# Patient Record
Sex: FEMALE | Race: OTHER | HISPANIC OR LATINO
[De-identification: names, ages, dates, MRNs, and addresses within clinical notes are randomized per-mention and may not be internally consistent; named-entity substitution may affect disease eponyms.]

---

## 2023-01-10 PROBLEM — Z00.00 ENCOUNTER FOR PREVENTIVE HEALTH EXAMINATION: Status: ACTIVE | Noted: 2023-01-10

## 2023-01-11 ENCOUNTER — ASOB RESULT (OUTPATIENT)
Age: 38
End: 2023-01-11

## 2023-01-11 ENCOUNTER — APPOINTMENT (OUTPATIENT)
Dept: ANTEPARTUM | Facility: CLINIC | Age: 38
End: 2023-01-11
Payer: MEDICAID

## 2023-01-11 DIAGNOSIS — O35.1XX0 MATERNAL CARE FOR (SUSPECTED) CHROMOSOMAL ABNORMALITY IN FETUS, NOT APPLICABLE OR UNSPECIFIED: ICD-10-CM

## 2023-01-11 PROCEDURE — 76813 OB US NUCHAL MEAS 1 GEST: CPT

## 2023-01-11 PROCEDURE — 36415 COLL VENOUS BLD VENIPUNCTURE: CPT

## 2023-01-16 LAB
ADDITIONAL US: NORMAL
COMMENTS: AFP: NORMAL
CRL SCAN TWIN B: NORMAL
CRL SCAN: NORMAL
CROWN RUMP LENGTH TWIN B: NORMAL
CROWN RUMP LENGTH: 72.9 MM
DOWN SYNDROME AGE RISK: NORMAL
DOWN SYNDROME INTERPRETATION: NORMAL
DOWN SYNDROME SCREENING RISK: NORMAL
GEST. AGE ON COLLECTION DATE: 13.1 WEEKS
HCG MOM: 0.97
HCG VALUE: 105.1 IU/ML
MATERNAL AGE AT EDD: 37.2 YR
NOTE: AFP: NORMAL
NT MOM TWIN B: NORMAL
NT TWIN B: NORMAL
NUCHAL TRANSLUCENCY (NT): 2 MM
NUCHAL TRANSLUCENCY MOM: 1.44
NUMBER OF FETUSES: 1
PAPP-A MOM: 0.37
PAPP-A VALUE: 609.2 NG/ML
RACE: NORMAL
RESULTS AFP: NORMAL
SONOGRAPHER ID#: NORMAL
SUBMIT PART 2 SAMPLE USING: NORMAL
TEST RESULTS: AFP: NORMAL
TRISOMY 18 AGE RISK: NORMAL
TRISOMY 18 INTERPRETATION: NORMAL
TRISOMY 18 SCREENING RISK: NORMAL
WEIGHT AFP: 115 LBS

## 2023-02-03 ENCOUNTER — APPOINTMENT (OUTPATIENT)
Dept: ANTEPARTUM | Facility: CLINIC | Age: 38
End: 2023-02-03
Payer: MEDICAID

## 2023-02-03 PROCEDURE — 36415 COLL VENOUS BLD VENIPUNCTURE: CPT

## 2023-02-16 LAB
ADDITIONAL US: NORMAL
AFP MOM: 0.79
AFP VALUE: 31.6 NG/ML
COLLECTED ON 2: NORMAL
COLLECTED ON: NORMAL
CRL SCAN TWIN B: NORMAL
CRL SCAN: NORMAL
CROWN RUMP LENGTH TWIN B: NORMAL
CROWN RUMP LENGTH: 72.9 MM
DIA MOM: 0.6
DIA VALUE: 108 PG/ML
DOWN SYNDROME AGE RISK: NORMAL
DOWN SYNDROME INTERPRETATION: NORMAL
DOWN SYNDROME SCREENING RISK: NORMAL
FIRST TRIMESTER SAMPLE: NORMAL
GEST. AGE ON COLLECTION DATE: 13.1 WEEKS
GESTATIONAL AGE: 16.4 WEEKS
HCG MOM: 1
HCG VALUE: 39.7 IU/ML
INSULIN DEP DIABETES: NO
MATERNAL AGE AT EDD: 37.2 YR
NT MOM TWIN B: NORMAL
NT TWIN B: NORMAL
NUCHAL TRANSLUCENCY (NT): 2 MM
NUCHAL TRANSLUCENCY MOM: 1.44
NUMBER OF FETUSES: 1
OPEN SPINA BIFIDA: NORMAL
OSB INTERPRETATION: NORMAL
PAPP-A MOM: 0.37
PAPP-A VALUE: 609.2 NG/ML
RACE: NORMAL
SECOND TRIMESTER SAMPLE: NORMAL
SEQUENTIAL 2 COMMENTS: NORMAL
SEQUENTIAL 2 NOTE: NORMAL
SEQUENTIAL 2 RESULTS: NORMAL
SEQUENTIAL 2 TEST RESULTS: NORMAL
SONOGRAPHER ID#: NORMAL
TRISOMY 18 AGE RISK: NORMAL
TRISOMY 18 INTERPRETATION: NORMAL
TRISOMY 18 SCREENING RISK: NORMAL
UE3 MOM: 1.74
UE3 VALUE: 1.91 NG/ML
WEIGHT AFP: 115 LBS
WEIGHT: 120 LBS

## 2023-03-10 ENCOUNTER — ASOB RESULT (OUTPATIENT)
Age: 38
End: 2023-03-10

## 2023-03-10 ENCOUNTER — APPOINTMENT (OUTPATIENT)
Dept: ANTEPARTUM | Facility: CLINIC | Age: 38
End: 2023-03-10
Payer: MEDICAID

## 2023-03-10 PROCEDURE — 76811 OB US DETAILED SNGL FETUS: CPT

## 2023-03-13 ENCOUNTER — APPOINTMENT (OUTPATIENT)
Dept: MATERNAL FETAL MEDICINE | Facility: CLINIC | Age: 38
End: 2023-03-13
Payer: MEDICAID

## 2023-03-13 ENCOUNTER — APPOINTMENT (OUTPATIENT)
Dept: ANTEPARTUM | Facility: CLINIC | Age: 38
End: 2023-03-13
Payer: MEDICAID

## 2023-03-13 VITALS
DIASTOLIC BLOOD PRESSURE: 72 MMHG | BODY MASS INDEX: 24.8 KG/M2 | RESPIRATION RATE: 18 BRPM | HEART RATE: 102 BPM | SYSTOLIC BLOOD PRESSURE: 118 MMHG | HEIGHT: 59 IN | WEIGHT: 123 LBS

## 2023-03-13 DIAGNOSIS — O28.0 ABNORMAL HEMATOLOGICAL FINDING ON ANTENATAL SCREENING OF MOTHER: ICD-10-CM

## 2023-03-13 DIAGNOSIS — O09.899 ABNORMAL HEMATOLOGICAL FINDING ON ANTENATAL SCREENING OF MOTHER: ICD-10-CM

## 2023-03-13 PROCEDURE — 99214 OFFICE O/P EST MOD 30 MIN: CPT | Mod: TH

## 2023-03-13 PROCEDURE — ZZZZZ: CPT

## 2023-03-13 RX ORDER — PRENATAL VIT NO.126/IRON/FOLIC 28MG-0.8MG
28-0.8 TABLET ORAL
Refills: 0 | Status: ACTIVE | COMMUNITY

## 2023-03-13 NOTE — DISCUSSION/SUMMARY
[FreeTextEntry1] : Repeat growth scan is scheduled at 26 weeks, and should be repeated every 4 weeks. \par \par Weekly fetal testing to begin at 36 weeks. \par \par Baby aspirin for PEC prophylaxis.

## 2023-03-13 NOTE — DATA REVIEWED
[FreeTextEntry1] : We reviewed the Level 2 sonogram from last week.\par \par The issues associated with low NILO A were discussed including associations with poor placental function. We discussed the increased incidence of preeclampisa, poor fetal growth and  delivery\par \par baby aspirin is suggested as prophylaxis to PEC in women at an increased risk. \par \par Increased surveillance of fetal growth is scheduled, with the next growth sonogram in 6 weeks. \par \par All of Ritas questions were answered.

## 2023-04-17 ENCOUNTER — ASOB RESULT (OUTPATIENT)
Age: 38
End: 2023-04-17

## 2023-04-17 ENCOUNTER — APPOINTMENT (OUTPATIENT)
Dept: ANTEPARTUM | Facility: CLINIC | Age: 38
End: 2023-04-17
Payer: MEDICAID

## 2023-04-17 PROCEDURE — 76816 OB US FOLLOW-UP PER FETUS: CPT

## 2023-04-17 PROCEDURE — 76820 UMBILICAL ARTERY ECHO: CPT | Mod: 59

## 2023-05-22 ENCOUNTER — APPOINTMENT (OUTPATIENT)
Dept: MATERNAL FETAL MEDICINE | Facility: CLINIC | Age: 38
End: 2023-05-22
Payer: MEDICAID

## 2023-05-22 ENCOUNTER — ASOB RESULT (OUTPATIENT)
Age: 38
End: 2023-05-22

## 2023-05-22 ENCOUNTER — APPOINTMENT (OUTPATIENT)
Dept: MATERNAL FETAL MEDICINE | Facility: CLINIC | Age: 38
End: 2023-05-22

## 2023-05-22 VITALS — BODY MASS INDEX: 25.6 KG/M2 | HEIGHT: 59 IN | WEIGHT: 127 LBS

## 2023-05-22 DIAGNOSIS — Z78.9 OTHER SPECIFIED HEALTH STATUS: ICD-10-CM

## 2023-05-22 DIAGNOSIS — O99.810 ABNORMAL GLUCOSE COMPLICATING PREGNANCY: ICD-10-CM

## 2023-05-22 PROCEDURE — G0108 DIAB MANAGE TRN  PER INDIV: CPT | Mod: 95

## 2023-05-30 ENCOUNTER — APPOINTMENT (OUTPATIENT)
Dept: ANTEPARTUM | Facility: CLINIC | Age: 38
End: 2023-05-30
Payer: MEDICAID

## 2023-05-30 ENCOUNTER — ASOB RESULT (OUTPATIENT)
Age: 38
End: 2023-05-30

## 2023-05-30 PROCEDURE — 76820 UMBILICAL ARTERY ECHO: CPT | Mod: 59

## 2023-05-30 PROCEDURE — 76816 OB US FOLLOW-UP PER FETUS: CPT

## 2023-06-05 ENCOUNTER — ASOB RESULT (OUTPATIENT)
Age: 38
End: 2023-06-05

## 2023-06-05 ENCOUNTER — APPOINTMENT (OUTPATIENT)
Dept: ANTEPARTUM | Facility: CLINIC | Age: 38
End: 2023-06-05
Payer: MEDICAID

## 2023-06-05 PROCEDURE — 76821 MIDDLE CEREBRAL ARTERY ECHO: CPT

## 2023-06-05 PROCEDURE — 76818 FETAL BIOPHYS PROFILE W/NST: CPT

## 2023-06-05 PROCEDURE — 76820 UMBILICAL ARTERY ECHO: CPT

## 2023-06-12 ENCOUNTER — ASOB RESULT (OUTPATIENT)
Age: 38
End: 2023-06-12

## 2023-06-12 ENCOUNTER — APPOINTMENT (OUTPATIENT)
Dept: ANTEPARTUM | Facility: CLINIC | Age: 38
End: 2023-06-12
Payer: MEDICAID

## 2023-06-12 PROCEDURE — 76818 FETAL BIOPHYS PROFILE W/NST: CPT

## 2023-06-12 PROCEDURE — 76820 UMBILICAL ARTERY ECHO: CPT

## 2023-06-19 ENCOUNTER — ASOB RESULT (OUTPATIENT)
Age: 38
End: 2023-06-19

## 2023-06-19 ENCOUNTER — APPOINTMENT (OUTPATIENT)
Dept: ANTEPARTUM | Facility: CLINIC | Age: 38
End: 2023-06-19
Payer: MEDICAID

## 2023-06-19 PROCEDURE — 76816 OB US FOLLOW-UP PER FETUS: CPT

## 2023-06-19 PROCEDURE — 76818 FETAL BIOPHYS PROFILE W/NST: CPT

## 2023-06-19 PROCEDURE — 76820 UMBILICAL ARTERY ECHO: CPT | Mod: 59

## 2023-06-24 ENCOUNTER — INPATIENT (INPATIENT)
Facility: HOSPITAL | Age: 38
LOS: 2 days | Discharge: HOME | DRG: 540 | End: 2023-06-27
Attending: OBSTETRICS & GYNECOLOGY | Admitting: OBSTETRICS & GYNECOLOGY
Payer: MEDICAID

## 2023-06-24 VITALS — HEIGHT: 59 IN | WEIGHT: 136.03 LBS

## 2023-06-24 DIAGNOSIS — O26.899 OTHER SPECIFIED PREGNANCY RELATED CONDITIONS, UNSPECIFIED TRIMESTER: ICD-10-CM

## 2023-06-24 DIAGNOSIS — Z3A.00 WEEKS OF GESTATION OF PREGNANCY NOT SPECIFIED: ICD-10-CM

## 2023-06-24 LAB
ABO RH CONFIRMATION: SIGNIFICANT CHANGE UP
BASOPHILS # BLD AUTO: 0.03 K/UL — SIGNIFICANT CHANGE UP (ref 0–0.2)
BASOPHILS NFR BLD AUTO: 0.3 % — SIGNIFICANT CHANGE UP (ref 0–2)
EOSINOPHIL # BLD AUTO: 0.1 K/UL — SIGNIFICANT CHANGE UP (ref 0–0.5)
EOSINOPHIL NFR BLD AUTO: 1 % — SIGNIFICANT CHANGE UP (ref 0–6)
HCT VFR BLD CALC: 37.3 % — SIGNIFICANT CHANGE UP (ref 34.5–45)
HGB BLD-MCNC: 13.3 G/DL — SIGNIFICANT CHANGE UP (ref 11.5–15.5)
IMM GRANULOCYTES NFR BLD AUTO: 0.5 % — SIGNIFICANT CHANGE UP (ref 0–0.9)
LYMPHOCYTES # BLD AUTO: 1.9 K/UL — SIGNIFICANT CHANGE UP (ref 1–3.3)
LYMPHOCYTES # BLD AUTO: 19.9 % — SIGNIFICANT CHANGE UP (ref 13–44)
MCHC RBC-ENTMCNC: 31.4 PG — SIGNIFICANT CHANGE UP (ref 27–34)
MCHC RBC-ENTMCNC: 35.7 GM/DL — SIGNIFICANT CHANGE UP (ref 32–36)
MCV RBC AUTO: 88.2 FL — SIGNIFICANT CHANGE UP (ref 80–100)
MONOCYTES # BLD AUTO: 0.59 K/UL — SIGNIFICANT CHANGE UP (ref 0–0.9)
MONOCYTES NFR BLD AUTO: 6.2 % — SIGNIFICANT CHANGE UP (ref 2–14)
NEUTROPHILS # BLD AUTO: 6.9 K/UL — SIGNIFICANT CHANGE UP (ref 1.8–7.4)
NEUTROPHILS NFR BLD AUTO: 72.1 % — SIGNIFICANT CHANGE UP (ref 43–77)
PLATELET # BLD AUTO: 184 K/UL — SIGNIFICANT CHANGE UP (ref 150–400)
RBC # BLD: 4.23 M/UL — SIGNIFICANT CHANGE UP (ref 3.8–5.2)
RBC # FLD: 13.5 % — SIGNIFICANT CHANGE UP (ref 10.3–14.5)
WBC # BLD: 9.57 K/UL — SIGNIFICANT CHANGE UP (ref 3.8–10.5)
WBC # FLD AUTO: 9.57 K/UL — SIGNIFICANT CHANGE UP (ref 3.8–10.5)

## 2023-06-24 PROCEDURE — 86900 BLOOD TYPING SEROLOGIC ABO: CPT

## 2023-06-24 PROCEDURE — 36415 COLL VENOUS BLD VENIPUNCTURE: CPT

## 2023-06-24 PROCEDURE — 86920 COMPATIBILITY TEST SPIN: CPT

## 2023-06-24 PROCEDURE — 74018 RADEX ABDOMEN 1 VIEW: CPT

## 2023-06-24 PROCEDURE — 72170 X-RAY EXAM OF PELVIS: CPT

## 2023-06-24 PROCEDURE — 85025 COMPLETE CBC W/AUTO DIFF WBC: CPT

## 2023-06-24 PROCEDURE — 85384 FIBRINOGEN ACTIVITY: CPT

## 2023-06-24 PROCEDURE — 84550 ASSAY OF BLOOD/URIC ACID: CPT

## 2023-06-24 PROCEDURE — 74018 RADEX ABDOMEN 1 VIEW: CPT | Mod: 26

## 2023-06-24 PROCEDURE — 85610 PROTHROMBIN TIME: CPT

## 2023-06-24 PROCEDURE — 80053 COMPREHEN METABOLIC PANEL: CPT

## 2023-06-24 PROCEDURE — 83615 LACTATE (LD) (LDH) ENZYME: CPT

## 2023-06-24 PROCEDURE — 86901 BLOOD TYPING SEROLOGIC RH(D): CPT

## 2023-06-24 PROCEDURE — 85027 COMPLETE CBC AUTOMATED: CPT

## 2023-06-24 PROCEDURE — 72170 X-RAY EXAM OF PELVIS: CPT | Mod: 26

## 2023-06-24 PROCEDURE — 82570 ASSAY OF URINE CREATININE: CPT

## 2023-06-24 PROCEDURE — 81001 URINALYSIS AUTO W/SCOPE: CPT

## 2023-06-24 PROCEDURE — 84156 ASSAY OF PROTEIN URINE: CPT

## 2023-06-24 PROCEDURE — 85730 THROMBOPLASTIN TIME PARTIAL: CPT

## 2023-06-24 PROCEDURE — 86850 RBC ANTIBODY SCREEN: CPT

## 2023-06-24 PROCEDURE — 86780 TREPONEMA PALLIDUM: CPT

## 2023-06-24 RX ORDER — OXYTOCIN 10 UNIT/ML
333.33 VIAL (ML) INJECTION
Qty: 20 | Refills: 0 | Status: DISCONTINUED | OUTPATIENT
Start: 2023-06-24 | End: 2023-06-25

## 2023-06-24 RX ORDER — TETANUS TOXOID, REDUCED DIPHTHERIA TOXOID AND ACELLULAR PERTUSSIS VACCINE, ADSORBED 5; 2.5; 8; 8; 2.5 [IU]/.5ML; [IU]/.5ML; UG/.5ML; UG/.5ML; UG/.5ML
0.5 SUSPENSION INTRAMUSCULAR ONCE
Refills: 0 | Status: DISCONTINUED | OUTPATIENT
Start: 2023-06-24 | End: 2023-06-27

## 2023-06-24 RX ORDER — DIPHENHYDRAMINE HCL 50 MG
25 CAPSULE ORAL EVERY 6 HOURS
Refills: 0 | Status: DISCONTINUED | OUTPATIENT
Start: 2023-06-24 | End: 2023-06-27

## 2023-06-24 RX ORDER — SODIUM CHLORIDE 9 MG/ML
1000 INJECTION, SOLUTION INTRAVENOUS
Refills: 0 | Status: DISCONTINUED | OUTPATIENT
Start: 2023-06-24 | End: 2023-06-27

## 2023-06-24 RX ORDER — MAGNESIUM HYDROXIDE 400 MG/1
30 TABLET, CHEWABLE ORAL
Refills: 0 | Status: DISCONTINUED | OUTPATIENT
Start: 2023-06-24 | End: 2023-06-27

## 2023-06-24 RX ORDER — CITRIC ACID/SODIUM CITRATE 300-500 MG
30 SOLUTION, ORAL ORAL ONCE
Refills: 0 | Status: COMPLETED | OUTPATIENT
Start: 2023-06-24 | End: 2023-06-24

## 2023-06-24 RX ORDER — OXYTOCIN 10 UNIT/ML
333.33 VIAL (ML) INJECTION
Qty: 20 | Refills: 0 | Status: DISCONTINUED | OUTPATIENT
Start: 2023-06-24 | End: 2023-06-27

## 2023-06-24 RX ORDER — LANOLIN
1 OINTMENT (GRAM) TOPICAL EVERY 6 HOURS
Refills: 0 | Status: DISCONTINUED | OUTPATIENT
Start: 2023-06-24 | End: 2023-06-27

## 2023-06-24 RX ORDER — AZITHROMYCIN 500 MG/1
500 TABLET, FILM COATED ORAL ONCE
Refills: 0 | Status: COMPLETED | OUTPATIENT
Start: 2023-06-24 | End: 2023-06-24

## 2023-06-24 RX ORDER — SODIUM CHLORIDE 9 MG/ML
1000 INJECTION, SOLUTION INTRAVENOUS ONCE
Refills: 0 | Status: COMPLETED | OUTPATIENT
Start: 2023-06-24 | End: 2023-06-24

## 2023-06-24 RX ORDER — SODIUM CHLORIDE 9 MG/ML
1000 INJECTION, SOLUTION INTRAVENOUS
Refills: 0 | Status: DISCONTINUED | OUTPATIENT
Start: 2023-06-24 | End: 2023-06-25

## 2023-06-24 RX ORDER — ACETAMINOPHEN 500 MG
975 TABLET ORAL
Refills: 0 | Status: DISCONTINUED | OUTPATIENT
Start: 2023-06-24 | End: 2023-06-27

## 2023-06-24 RX ORDER — IBUPROFEN 200 MG
600 TABLET ORAL EVERY 6 HOURS
Refills: 0 | Status: DISCONTINUED | OUTPATIENT
Start: 2023-06-24 | End: 2023-06-24

## 2023-06-24 RX ORDER — OXYCODONE HYDROCHLORIDE 5 MG/1
5 TABLET ORAL
Refills: 0 | Status: COMPLETED | OUTPATIENT
Start: 2023-06-24 | End: 2023-07-01

## 2023-06-24 RX ORDER — SIMETHICONE 80 MG/1
80 TABLET, CHEWABLE ORAL EVERY 4 HOURS
Refills: 0 | Status: DISCONTINUED | OUTPATIENT
Start: 2023-06-24 | End: 2023-06-27

## 2023-06-24 RX ORDER — FAMOTIDINE 10 MG/ML
20 INJECTION INTRAVENOUS ONCE
Refills: 0 | Status: COMPLETED | OUTPATIENT
Start: 2023-06-24 | End: 2023-06-24

## 2023-06-24 RX ORDER — OXYCODONE HYDROCHLORIDE 5 MG/1
5 TABLET ORAL ONCE
Refills: 0 | Status: DISCONTINUED | OUTPATIENT
Start: 2023-06-24 | End: 2023-06-27

## 2023-06-24 RX ORDER — HYDROMORPHONE HYDROCHLORIDE 2 MG/ML
0.5 INJECTION INTRAMUSCULAR; INTRAVENOUS; SUBCUTANEOUS
Refills: 0 | Status: DISCONTINUED | OUTPATIENT
Start: 2023-06-24 | End: 2023-06-27

## 2023-06-24 RX ADMIN — Medication 30 MILLILITER(S): at 21:18

## 2023-06-24 RX ADMIN — Medication 975 MILLIGRAM(S): at 23:35

## 2023-06-24 RX ADMIN — AZITHROMYCIN 255 MILLIGRAM(S): 500 TABLET, FILM COATED ORAL at 21:28

## 2023-06-24 RX ADMIN — Medication 1000 MILLIUNIT(S)/MIN: at 22:47

## 2023-06-24 RX ADMIN — SODIUM CHLORIDE 2000 MILLILITER(S): 9 INJECTION, SOLUTION INTRAVENOUS at 21:10

## 2023-06-24 RX ADMIN — FAMOTIDINE 20 MILLIGRAM(S): 10 INJECTION INTRAVENOUS at 21:18

## 2023-06-24 NOTE — PATIENT PROFILE OB - FUNCTIONAL ASSESSMENT - BASIC MOBILITY 6.
4-calculated by average/Not able to assess (calculate score using [facility] averaging method)

## 2023-06-24 NOTE — PATIENT PROFILE OB - FALL HARM RISK - UNIVERSAL INTERVENTIONS
Bed in lowest position, wheels locked, appropriate side rails in place/Call bell, personal items and telephone in reach/Instruct patient to call for assistance before getting out of bed or chair/Non-slip footwear when patient is out of bed/[place] to call system/Physically safe environment - no spills, clutter or unnecessary equipment/Purposeful Proactive Rounding/Room/bathroom lighting operational, light cord in reach

## 2023-06-24 NOTE — PATIENT PROFILE OB - PRO ROOMING IN YN OB
Holter exam results discussed with patient. Nothing significant per Dr Dubon. Needs to see PCP for possible anxiety triggered sinus tach   yes

## 2023-06-24 NOTE — PATIENT PROFILE OB - AS SC BRADEN NUTRITION
Lab Results   Component Value Date    HGBA1C 9 2 (H) 12/08/2022       Recent Labs     12/10/22  1113 12/10/22  1550 12/10/22  2101 12/11/22  0613   POCGLU 203* 147* 201* 188*       Blood Sugar Average: Last 72 hrs:  (P) 222 [id number]   Home Regimen: Metformin, Amaryl, Trulicity, Farxiga    Plan:  • Hold oral antihyperglycemics  • Diet Consistent CHO Level 2 (5 CHO servings/75g CHO per meal)  • Insulin regimen  o Glucose checks and Insulin correction ACHS  • Goal -180 while admitted, adjusting insulin regimen as appropriate  Monitor for hypoglycemia and treat per protocol    Close outpatient follow-up with family doctor (3) adequate

## 2023-06-25 ENCOUNTER — TRANSCRIPTION ENCOUNTER (OUTPATIENT)
Age: 38
End: 2023-06-25

## 2023-06-25 LAB
ALBUMIN SERPL ELPH-MCNC: 1.9 G/DL — LOW (ref 3.3–5)
ALBUMIN SERPL ELPH-MCNC: 2.1 G/DL — LOW (ref 3.3–5)
ALP SERPL-CCNC: 236 U/L — HIGH (ref 40–120)
ALP SERPL-CCNC: 301 U/L — HIGH (ref 40–120)
ALT FLD-CCNC: 19 U/L — SIGNIFICANT CHANGE UP (ref 12–78)
ALT FLD-CCNC: 20 U/L — SIGNIFICANT CHANGE UP (ref 12–78)
ANION GAP SERPL CALC-SCNC: 5 MMOL/L — SIGNIFICANT CHANGE UP (ref 5–17)
ANION GAP SERPL CALC-SCNC: 8 MMOL/L — SIGNIFICANT CHANGE UP (ref 5–17)
APPEARANCE UR: CLEAR — SIGNIFICANT CHANGE UP
APTT BLD: 29.1 SEC — SIGNIFICANT CHANGE UP (ref 27.5–35.5)
AST SERPL-CCNC: 23 U/L — SIGNIFICANT CHANGE UP (ref 15–37)
AST SERPL-CCNC: 30 U/L — SIGNIFICANT CHANGE UP (ref 15–37)
BASOPHILS # BLD AUTO: 0.06 K/UL — SIGNIFICANT CHANGE UP (ref 0–0.2)
BASOPHILS NFR BLD AUTO: 0.5 % — SIGNIFICANT CHANGE UP (ref 0–2)
BILIRUB SERPL-MCNC: 0.3 MG/DL — SIGNIFICANT CHANGE UP (ref 0.2–1.2)
BILIRUB SERPL-MCNC: 0.4 MG/DL — SIGNIFICANT CHANGE UP (ref 0.2–1.2)
BILIRUB UR-MCNC: NEGATIVE — SIGNIFICANT CHANGE UP
BUN SERPL-MCNC: 10 MG/DL — SIGNIFICANT CHANGE UP (ref 7–23)
BUN SERPL-MCNC: 10 MG/DL — SIGNIFICANT CHANGE UP (ref 7–23)
CALCIUM SERPL-MCNC: 7.9 MG/DL — LOW (ref 8.5–10.1)
CALCIUM SERPL-MCNC: 8.4 MG/DL — LOW (ref 8.5–10.1)
CHLORIDE SERPL-SCNC: 108 MMOL/L — SIGNIFICANT CHANGE UP (ref 96–108)
CHLORIDE SERPL-SCNC: 110 MMOL/L — HIGH (ref 96–108)
CO2 SERPL-SCNC: 20 MMOL/L — LOW (ref 22–31)
CO2 SERPL-SCNC: 23 MMOL/L — SIGNIFICANT CHANGE UP (ref 22–31)
COLOR SPEC: YELLOW — SIGNIFICANT CHANGE UP
CREAT ?TM UR-MCNC: 48 MG/DL — SIGNIFICANT CHANGE UP
CREAT SERPL-MCNC: 0.51 MG/DL — SIGNIFICANT CHANGE UP (ref 0.5–1.3)
CREAT SERPL-MCNC: 0.58 MG/DL — SIGNIFICANT CHANGE UP (ref 0.5–1.3)
DIFF PNL FLD: ABNORMAL
EGFR: 119 ML/MIN/1.73M2 — SIGNIFICANT CHANGE UP
EGFR: 123 ML/MIN/1.73M2 — SIGNIFICANT CHANGE UP
EOSINOPHIL # BLD AUTO: 0.01 K/UL — SIGNIFICANT CHANGE UP (ref 0–0.5)
EOSINOPHIL NFR BLD AUTO: 0.1 % — SIGNIFICANT CHANGE UP (ref 0–6)
GLUCOSE SERPL-MCNC: 113 MG/DL — HIGH (ref 70–99)
GLUCOSE SERPL-MCNC: 79 MG/DL — SIGNIFICANT CHANGE UP (ref 70–99)
GLUCOSE UR QL: NEGATIVE — SIGNIFICANT CHANGE UP
HCT VFR BLD CALC: 31.9 % — LOW (ref 34.5–45)
HCT VFR BLD CALC: 33.2 % — LOW (ref 34.5–45)
HCT VFR BLD CALC: 35.3 % — SIGNIFICANT CHANGE UP (ref 34.5–45)
HGB BLD-MCNC: 11 G/DL — LOW (ref 11.5–15.5)
HGB BLD-MCNC: 11.4 G/DL — LOW (ref 11.5–15.5)
HGB BLD-MCNC: 12 G/DL — SIGNIFICANT CHANGE UP (ref 11.5–15.5)
IMM GRANULOCYTES NFR BLD AUTO: 0.4 % — SIGNIFICANT CHANGE UP (ref 0–0.9)
INR BLD: 0.93 RATIO — SIGNIFICANT CHANGE UP (ref 0.88–1.16)
KETONES UR-MCNC: ABNORMAL
LDH SERPL L TO P-CCNC: 244 U/L — HIGH (ref 84–241)
LEUKOCYTE ESTERASE UR-ACNC: NEGATIVE — SIGNIFICANT CHANGE UP
LYMPHOCYTES # BLD AUTO: 1.45 K/UL — SIGNIFICANT CHANGE UP (ref 1–3.3)
LYMPHOCYTES # BLD AUTO: 12.4 % — LOW (ref 13–44)
MCHC RBC-ENTMCNC: 30.8 PG — SIGNIFICANT CHANGE UP (ref 27–34)
MCHC RBC-ENTMCNC: 30.9 PG — SIGNIFICANT CHANGE UP (ref 27–34)
MCHC RBC-ENTMCNC: 31.2 PG — SIGNIFICANT CHANGE UP (ref 27–34)
MCHC RBC-ENTMCNC: 34 GM/DL — SIGNIFICANT CHANGE UP (ref 32–36)
MCHC RBC-ENTMCNC: 34.3 GM/DL — SIGNIFICANT CHANGE UP (ref 32–36)
MCHC RBC-ENTMCNC: 34.5 GM/DL — SIGNIFICANT CHANGE UP (ref 32–36)
MCV RBC AUTO: 89.6 FL — SIGNIFICANT CHANGE UP (ref 80–100)
MCV RBC AUTO: 90.5 FL — SIGNIFICANT CHANGE UP (ref 80–100)
MCV RBC AUTO: 91 FL — SIGNIFICANT CHANGE UP (ref 80–100)
MONOCYTES # BLD AUTO: 0.53 K/UL — SIGNIFICANT CHANGE UP (ref 0–0.9)
MONOCYTES NFR BLD AUTO: 4.5 % — SIGNIFICANT CHANGE UP (ref 2–14)
NEUTROPHILS # BLD AUTO: 9.63 K/UL — HIGH (ref 1.8–7.4)
NEUTROPHILS NFR BLD AUTO: 82.1 % — HIGH (ref 43–77)
NITRITE UR-MCNC: NEGATIVE — SIGNIFICANT CHANGE UP
PH UR: 6 — SIGNIFICANT CHANGE UP (ref 5–8)
PLATELET # BLD AUTO: 177 K/UL — SIGNIFICANT CHANGE UP (ref 150–400)
PLATELET # BLD AUTO: 180 K/UL — SIGNIFICANT CHANGE UP (ref 150–400)
PLATELET # BLD AUTO: 181 K/UL — SIGNIFICANT CHANGE UP (ref 150–400)
POTASSIUM SERPL-MCNC: 3.4 MMOL/L — LOW (ref 3.5–5.3)
POTASSIUM SERPL-MCNC: 4.2 MMOL/L — SIGNIFICANT CHANGE UP (ref 3.5–5.3)
POTASSIUM SERPL-SCNC: 3.4 MMOL/L — LOW (ref 3.5–5.3)
POTASSIUM SERPL-SCNC: 4.2 MMOL/L — SIGNIFICANT CHANGE UP (ref 3.5–5.3)
PROT ?TM UR-MCNC: 38 MG/DL — HIGH (ref 0–12)
PROT SERPL-MCNC: 5.8 GM/DL — LOW (ref 6–8.3)
PROT SERPL-MCNC: 6.3 GM/DL — SIGNIFICANT CHANGE UP (ref 6–8.3)
PROT UR-MCNC: 30 MG/DL
PROT/CREAT UR-RTO: 0.8 RATIO — HIGH (ref 0–0.2)
PROTHROM AB SERPL-ACNC: 10.8 SEC — SIGNIFICANT CHANGE UP (ref 10.5–13.4)
RBC # BLD: 3.56 M/UL — LOW (ref 3.8–5.2)
RBC # BLD: 3.65 M/UL — LOW (ref 3.8–5.2)
RBC # BLD: 3.9 M/UL — SIGNIFICANT CHANGE UP (ref 3.8–5.2)
RBC # FLD: 13.5 % — SIGNIFICANT CHANGE UP (ref 10.3–14.5)
RBC # FLD: 13.7 % — SIGNIFICANT CHANGE UP (ref 10.3–14.5)
RBC # FLD: 14 % — SIGNIFICANT CHANGE UP (ref 10.3–14.5)
SODIUM SERPL-SCNC: 136 MMOL/L — SIGNIFICANT CHANGE UP (ref 135–145)
SODIUM SERPL-SCNC: 138 MMOL/L — SIGNIFICANT CHANGE UP (ref 135–145)
SP GR SPEC: 1.01 — SIGNIFICANT CHANGE UP (ref 1.01–1.02)
URATE SERPL-MCNC: 4 MG/DL — SIGNIFICANT CHANGE UP (ref 2.5–7)
UROBILINOGEN FLD QL: NEGATIVE — SIGNIFICANT CHANGE UP
WBC # BLD: 10.82 K/UL — HIGH (ref 3.8–10.5)
WBC # BLD: 11.73 K/UL — HIGH (ref 3.8–10.5)
WBC # BLD: 12.95 K/UL — HIGH (ref 3.8–10.5)
WBC # FLD AUTO: 10.82 K/UL — HIGH (ref 3.8–10.5)
WBC # FLD AUTO: 11.73 K/UL — HIGH (ref 3.8–10.5)
WBC # FLD AUTO: 12.95 K/UL — HIGH (ref 3.8–10.5)

## 2023-06-25 RX ORDER — SODIUM CHLORIDE 9 MG/ML
500 INJECTION INTRAMUSCULAR; INTRAVENOUS; SUBCUTANEOUS ONCE
Refills: 0 | Status: COMPLETED | OUTPATIENT
Start: 2023-06-25 | End: 2023-06-25

## 2023-06-25 RX ORDER — ACETAMINOPHEN 500 MG
3 TABLET ORAL
Qty: 0 | Refills: 0 | DISCHARGE
Start: 2023-06-25

## 2023-06-25 RX ORDER — METRONIDAZOLE 500 MG
TABLET ORAL
Refills: 0 | Status: DISCONTINUED | OUTPATIENT
Start: 2023-06-25 | End: 2023-06-27

## 2023-06-25 RX ORDER — ENOXAPARIN SODIUM 100 MG/ML
40 INJECTION SUBCUTANEOUS EVERY 24 HOURS
Refills: 0 | Status: DISCONTINUED | OUTPATIENT
Start: 2023-06-25 | End: 2023-06-25

## 2023-06-25 RX ORDER — OXYCODONE HYDROCHLORIDE 5 MG/1
5 TABLET ORAL
Refills: 0 | Status: DISCONTINUED | OUTPATIENT
Start: 2023-06-25 | End: 2023-06-27

## 2023-06-25 RX ORDER — METRONIDAZOLE 500 MG
500 TABLET ORAL EVERY 8 HOURS
Refills: 0 | Status: DISCONTINUED | OUTPATIENT
Start: 2023-06-25 | End: 2023-06-27

## 2023-06-25 RX ORDER — METRONIDAZOLE 500 MG
500 TABLET ORAL ONCE
Refills: 0 | Status: COMPLETED | OUTPATIENT
Start: 2023-06-25 | End: 2023-06-25

## 2023-06-25 RX ORDER — CEFAZOLIN SODIUM 1 G
2000 VIAL (EA) INJECTION ONCE
Refills: 0 | Status: COMPLETED | OUTPATIENT
Start: 2023-06-25 | End: 2023-06-25

## 2023-06-25 RX ADMIN — Medication 975 MILLIGRAM(S): at 19:05

## 2023-06-25 RX ADMIN — OXYCODONE HYDROCHLORIDE 5 MILLIGRAM(S): 5 TABLET ORAL at 00:42

## 2023-06-25 RX ADMIN — OXYCODONE HYDROCHLORIDE 5 MILLIGRAM(S): 5 TABLET ORAL at 09:45

## 2023-06-25 RX ADMIN — Medication 975 MILLIGRAM(S): at 00:00

## 2023-06-25 RX ADMIN — Medication 975 MILLIGRAM(S): at 06:44

## 2023-06-25 RX ADMIN — MAGNESIUM HYDROXIDE 30 MILLILITER(S): 400 TABLET, CHEWABLE ORAL at 22:39

## 2023-06-25 RX ADMIN — OXYCODONE HYDROCHLORIDE 5 MILLIGRAM(S): 5 TABLET ORAL at 09:07

## 2023-06-25 RX ADMIN — OXYCODONE HYDROCHLORIDE 5 MILLIGRAM(S): 5 TABLET ORAL at 22:39

## 2023-06-25 RX ADMIN — OXYCODONE HYDROCHLORIDE 5 MILLIGRAM(S): 5 TABLET ORAL at 14:08

## 2023-06-25 RX ADMIN — OXYCODONE HYDROCHLORIDE 5 MILLIGRAM(S): 5 TABLET ORAL at 23:39

## 2023-06-25 RX ADMIN — OXYCODONE HYDROCHLORIDE 5 MILLIGRAM(S): 5 TABLET ORAL at 01:42

## 2023-06-25 RX ADMIN — SODIUM CHLORIDE 1000 MILLILITER(S): 9 INJECTION INTRAMUSCULAR; INTRAVENOUS; SUBCUTANEOUS at 18:30

## 2023-06-25 RX ADMIN — Medication 975 MILLIGRAM(S): at 11:20

## 2023-06-25 RX ADMIN — Medication 975 MILLIGRAM(S): at 18:25

## 2023-06-25 RX ADMIN — OXYCODONE HYDROCHLORIDE 5 MILLIGRAM(S): 5 TABLET ORAL at 15:00

## 2023-06-25 RX ADMIN — Medication 100 MILLIGRAM(S): at 19:58

## 2023-06-25 RX ADMIN — Medication 975 MILLIGRAM(S): at 12:00

## 2023-06-25 RX ADMIN — SIMETHICONE 80 MILLIGRAM(S): 80 TABLET, CHEWABLE ORAL at 22:39

## 2023-06-25 RX ADMIN — Medication 100 MILLIGRAM(S): at 18:26

## 2023-06-25 NOTE — DISCHARGE NOTE OB - NS MD DC FALL RISK RISK
For information on Fall & Injury Prevention, visit: [email or web address] OR  [email or web address] OR  https://www.cdc.gov/steadi/patient.html

## 2023-06-25 NOTE — PROVIDER CONTACT NOTE (OTHER) - SITUATION
np sea dressing  not holding suction, drainage noted
s/p stat c/s HR tachy all day, contacted second time for elvated HR of 120

## 2023-06-25 NOTE — DISCHARGE NOTE OB - PATIENT PORTAL LINK FT
You can access the FollowMyHealth Patient Portal offered by [facility] by registering at the following website: [email or web address]. By joining [employer]’s FollowMyHealth portal, you will also be able to view your health information using other applications (apps) compatible with our system.

## 2023-06-25 NOTE — DISCHARGE NOTE OB - HOSPITAL COURSE
38 year old  female s/p uncomplicated primary  on 2023 at 36w5d stat under general anesthesia 2/2 breech and in labor. Postop patient found to have  PEC w.o SF. Patient transferred to post partum unit. At the time of discharge patient was tolerating regular diet PO, ambulating, voiding, and demonstrating bowel function. Pain well controlled with pain medications PRN.

## 2023-06-25 NOTE — PROGRESS NOTE ADULT - ASSESSMENT
A/P:    -Vital signs stable  -Hgb: 12.0> 11.0  -Voiding, tolerating PO, bowel function nml   - Sotelo in place. To be discontinued with TOV to follow  -Advance care as tolerated   -Continue routine postpartum and postoperative care and education  -Healthy infant at bedside  - DVT ppx: Lovenox ordered/ Ambulation encouraged. SCDs while in bed.

## 2023-06-25 NOTE — PROVIDER CONTACT NOTE (OTHER) - BACKGROUND
s/p stat c/s Add 52 Modifier (Optional): no Pared With?: 15 blade Consent: The patient's consent was obtained including but not limited to risks of crusting, scabbing, blistering, scarring, darker or lighter pigmentary change, recurrence, incomplete removal and infection. Show Topical Anesthesia Variable?: Yes Medical Necessity Information: It is in your best interest to select a reason for this procedure from the list below. All of these items fulfill various CMS LCD requirements except the new and changing color options. Medical Necessity Clause: This procedure was medically necessary because the lesions that were treated were: Detail Level: Detailed Post-Care Instructions: I reviewed with the patient in detail post-care instructions. Patient is to wear sunprotection, and avoid picking at any of the treated lesions. Pt may apply Vaseline to crusted or scabbing areas. Spray Paint Text: The liquid nitrogen was applied to the skin utilizing a spray paint frosting technique.

## 2023-06-25 NOTE — DISCHARGE NOTE OB - MEDICATION SUMMARY - MEDICATIONS TO TAKE
I will START or STAY ON the medications listed below when I get home from the hospital:    acetaminophen 325 mg oral tablet  -- 3 tab(s) by mouth 3 times a day as needed for  moderate pain  -- Indication: For Weeks of gestation of pregnancy not specified

## 2023-06-25 NOTE — DISCHARGE NOTE OB - PLAN OF CARE
: 1) Please take ibuprofen, tylenol and other prescribed medications as needed for pain.  2) Nothing in the vagina for 6 weeks (including no sex, no tampons, and no douching).  3) No tub baths or pools for 6 weeks. Showers are okay. Please keep your incision dry until your follow up appointment.  4) Please call your doctor for a follow up appointment in 1 week  5) Please call the office sooner if you have heavy vaginal bleeding, severe abdominal pain, or fever over 100.4F.

## 2023-06-25 NOTE — PROGRESS NOTE ADULT - SUBJECTIVE AND OBJECTIVE BOX
TRAVON RAMOS is a 38y  now POD#1 s/p _cesarean section at 36 w 5d gestation stat under general anesthesia 2/2 breech and in labor (10 cm); extensive uterine extensions to cervix noted.    S:    No acute events overnight.   Pt feels tired and lethargic.   Pain controlled with current treatment regimen.   Has not yet ambulater and is tolerating PO.   - flatus/-BM/+ voiding   She endorses appropriate lochia, which is decreasing.   . She denies feeling engorged.   She denies fevers, chills, nausea and vomiting.   She denies lightheadedness, dizziness, palpitations, chest pain and SOB.     O:    T(C): 37.7 (23 @ 09:15), Max: 37.7 (23 @ 06:15)  HR: 108 (23 @ 09:49) (91 - 120)  BP: 120/84 (23 @ 09:15) (118/71 - 144/99)  RR: 16 (23 @ 09:15) (16 - 25)  SpO2: 97% (23 @ 09:15) (92% - 100%)    Gen: NAD, AOx3  CV: RRR, S1/S2 present  Pulm: CTAB  Abdomen:  Soft, + tender, non-distended, +bowel sounds  Incision: Clean/dry/intact with dressing place  Uterus:  Fundus firm below umbilicus  VE:  Expected lochia  Ext:  Bilateral lower extremities non-tender and non-edematous                          11.0   11.73 )-----------( 177      ( 2023 09:19 )             31.9         138  |  110<H>  |  10  ----------------------------<  113<H>  4.2   |  23  |  0.58    Ca    8.4<L>      2023 23:57    TPro  6.3  /  Alb  2.1<L>  /  TBili  0.3  /  DBili  x   /  AST  23  /  ALT  20  /  AlkPhos  301<H>         TRAVON RAMOS is a 38y  now POD#1 s/p _cesarean section at 36 w 5d gestation stat under general anesthesia 2/2 breech and in labor (10 cm); extensive uterine extensions to cervix noted.    S:    No acute events overnight.   Pt feels tired and lethargic.   Pain controlled with current treatment regimen.   Has not yet ambulated and is tolerating PO.   - flatus/-BM/+ voiding   She endorses appropriate lochia, which is decreasing.   . She denies feeling engorged.   She denies fevers, chills, nausea and vomiting.   She denies lightheadedness, dizziness, palpitations, chest pain and SOB.     O:    T(C): 37.7 (23 @ 09:15), Max: 37.7 (23 @ 06:15)  HR: 108 (23 @ 09:49) (91 - 120)  BP: 120/84 (23 @ 09:15) (118/71 - 144/99)  RR: 16 (23 @ 09:15) (16 - 25)  SpO2: 97% (23 @ 09:15) (92% - 100%)    Gen: NAD, AOx3  CV: RRR, S1/S2 present  Pulm: CTAB  Abdomen:  Soft, + tender, non-distended, +bowel sounds  Incision: Clean/dry/intact with dressing place  Uterus:  Fundus firm below umbilicus  VE:  Expected lochia  Ext:  Bilateral lower extremities non-tender and non-edematous                          11.0   11.73 )-----------( 177      ( 2023 09:19 )             31.9         138  |  110<H>  |  10  ----------------------------<  113<H>  4.2   |  23  |  0.58    Ca    8.4<L>      2023 23:57    TPro  6.3  /  Alb  2.1<L>  /  TBili  0.3  /  DBili  x   /  AST  23  /  ALT  20  /  AlkPhos  301<H>

## 2023-06-25 NOTE — DISCHARGE NOTE OB - CARE PROVIDER_API CALL
Sayra Duke  Obstetrics and Gynecology  [address] [place] [address]  Phone: [phone]  Fax: [phone]  Follow Up Time:

## 2023-06-25 NOTE — DISCHARGE NOTE OB - CARE PLAN
1 Principal Discharge DX:	Single delivery by   Assessment and plan of treatment:	: 1) Please take ibuprofen, tylenol and other prescribed medications as needed for pain.  2) Nothing in the vagina for 6 weeks (including no sex, no tampons, and no douching).  3) No tub baths or pools for 6 weeks. Showers are okay. Please keep your incision dry until your follow up appointment.  4) Please call your doctor for a follow up appointment in 1 week  5) Please call the office sooner if you have heavy vaginal bleeding, severe abdominal pain, or fever over 100.4F.

## 2023-06-25 NOTE — CHART NOTE - NSCHARTNOTEFT_GEN_A_CORE
TRAVON RAMOS is a 38y  now POD#1 s/p  section at 36 w 5d gestation stat under general anesthesia 2/2 breech and in labor (10 cm) with extensive uterine extensions to cervix noted. called by RN to bedside due to HR of 120. Pt seen and examined at bedside, tachycardia appreciated on exam to 136. Lungs clear to auscultation b/l. Abdomen non distended, mildly tender, decreased in vaginal bleeding. Highest temp today of of 99.9, /88, RR 16, O2 97.    Patient with untreated BV during prenatal course.    Plan  - Stat CBC and rectal temp  - IVF bolus  - continue to monitor  - Ancef 2g x1 and Flagyll 500mg Q8    - d/w Dr. Del Angel TRAVON RAMOS is a 38y  now POD#1 s/p  section at 36 w 5d gestation stat under general anesthesia 2/2 breech and in labor (10 cm) with extensive uterine extensions to cervix noted. called by RN to bedside due to HR of 120. Pt seen and examined at bedside, tachycardia appreciated on exam to 136. Lungs clear to auscultation b/l. Abdomen non distended, mildly tender, decreased in vaginal bleeding. Highest temp today of of 99.9, /88, RR 16, O2 97.    Patient with untreated BV during prenatal course.    Vital Signs Last 24 Hrs  T(C): 36.8 (2023 17:00), Max: 37.7 (2023 06:15)  T(F): 98.2 (2023 17:00), Max: 99.9 (2023 06:15)  HR: 121 (2023 17:00) (91 - 121)  BP: 116/88 (2023 17:00) (110/87 - 144/99)  BP(mean): --  RR: 16 (2023 17:00) (16 - 25)  SpO2: 97% (2023 17:00) (92% - 100%)    Parameters below as of 2023 17:00  Patient On (Oxygen Delivery Method): room air        Plan  - Stat CBC and rectal temp  - IVF bolus  - continue to monitor  - Ancef 2g x1 and Flagyll 500mg Q8    - d/w Dr. Del Angel TRAVON RAMOS is a 38y  now POD#1 s/p  section at 36 w 5d gestation stat under general anesthesia 2/2 breech and in labor (10 cm) with extensive uterine extensions to cervix noted. called by RN to bedside due to HR of 120. Pt seen and examined at bedside, tachycardia appreciated on exam to 136. Lungs clear to auscultation b/l. Abdomen non distended, mildly tender, decreased in vaginal bleeding. Highest temp today of of 99.9, /88, RR 16, O2 97.    Patient with untreated BV during prenatal course.    Vital Signs Last 24 Hrs  T(C): 36.8 (2023 17:00), Max: 37.7 (2023 06:15)  T(F): 98.2 (2023 17:00), Max: 99.9 (2023 06:15)  HR: 121 (2023 17:00) (91 - 121)  BP: 116/88 (2023 17:00) (110/87 - 144/99)  BP(mean): --  RR: 16 (2023 17:00) (16 - 25)  SpO2: 97% (2023 17:00) (92% - 100%)    Parameters below as of 2023 17:00  Patient On (Oxygen Delivery Method): room air        Plan  - Stat CBC and rectal temp  - IVF bolus  - continue to monitor  - Ancef 2g x1 and Flagyl 500mg Q8    - d/w Dr. Del Angel      38y  now POD#1 s/p  section at 36 w 5d gestation stat under general anesthesia 2/2 breech and in labor (10 cm) with extensive uterine extensions to cervix noted. called by RN to bedside due to HR of 120. Pt seen and examined at bedside, tachycardia appreciated on exam to 136. Lungs clear to auscultation b/l. Abdomen non distended, mildly tender, decreased in vaginal bleeding. Highest temp today of of 99.9, /88, RR 16, O2 97.      Patient seen and evaluated by me.  Patient not in distress.  Denies heavy vaginal bleeding, dyspnea, dizziness, or chest pain.  Abdomen soft   non distended   appropriate post op pain    dressing clean and dry  History of untreated BV.  Concern for endomyometritis.  I agree with resident assessment    iv bolus now  Iv antibiotics ancef 2gm and flagyl 500 mg  monitor vital signs  cbc now    Dr Del Angel

## 2023-06-25 NOTE — PROVIDER CONTACT NOTE (OTHER) - ASSESSMENT
patient stable, voiding freely, no active bleeding, pain mangement in progress.
patient stable, no active bleeding noted.

## 2023-06-26 ENCOUNTER — APPOINTMENT (OUTPATIENT)
Dept: ANTEPARTUM | Facility: CLINIC | Age: 38
End: 2023-06-26

## 2023-06-26 LAB — T PALLIDUM AB TITR SER: NEGATIVE — SIGNIFICANT CHANGE UP

## 2023-06-26 RX ADMIN — OXYCODONE HYDROCHLORIDE 5 MILLIGRAM(S): 5 TABLET ORAL at 09:11

## 2023-06-26 RX ADMIN — Medication 975 MILLIGRAM(S): at 23:53

## 2023-06-26 RX ADMIN — Medication 100 MILLIGRAM(S): at 12:40

## 2023-06-26 RX ADMIN — Medication 975 MILLIGRAM(S): at 00:48

## 2023-06-26 RX ADMIN — OXYCODONE HYDROCHLORIDE 5 MILLIGRAM(S): 5 TABLET ORAL at 20:33

## 2023-06-26 RX ADMIN — Medication 975 MILLIGRAM(S): at 12:40

## 2023-06-26 RX ADMIN — Medication 100 MILLIGRAM(S): at 04:11

## 2023-06-26 RX ADMIN — Medication 100 MILLIGRAM(S): at 22:08

## 2023-06-26 RX ADMIN — OXYCODONE HYDROCHLORIDE 5 MILLIGRAM(S): 5 TABLET ORAL at 21:30

## 2023-06-26 RX ADMIN — OXYCODONE HYDROCHLORIDE 5 MILLIGRAM(S): 5 TABLET ORAL at 08:11

## 2023-06-26 RX ADMIN — Medication 975 MILLIGRAM(S): at 17:41

## 2023-06-26 RX ADMIN — Medication 975 MILLIGRAM(S): at 01:48

## 2023-06-26 NOTE — PROGRESS NOTE ADULT - SUBJECTIVE AND OBJECTIVE BOX
TRAVON RAMOS is a 38y  now POD#2 s/p  section at 36 w 5d gestation stat under general anesthesia 2/2 breech and in labor (10 cm); extensive uterine extensions to cervix noted.    S:    No acute events overnight.   The patient has no complaints.  Pain controlled with current treatment regimen.   She is ambulating without difficulty and tolerating PO.   + flatus/-BM/+ voiding   She endorses appropriate lochia, which is decreasing.  She denies fevers, chills, nausea and vomiting.   She denies lightheadedness, dizziness, palpitations, chest pain and SOB.     O:    T(C): 36.4 (23 @ 04:00), Max: 37.7 (23 @ 09:15)  HR: 89 (23 @ 04:00) (89 - 121)  BP: 110/77 (23 @ 04:00) (110/77 - 126/66)  RR: 16 (23 @ 04:00) (16 - 16)  SpO2: 99% (23 @ 04:00) (96% - 99%)    Gen: NAD, AOx3  Breast: Nontender, non-engorged   Abdomen:  Soft, non-tender, non-distended, +bowel sounds  Incision: Clean/dry/intact with NP seal dressing not saturated  Uterus:  Fundus firm below umbilicus  VE:  Expectant lochia  Ext:  Non-tender and non-edematous

## 2023-06-27 VITALS
DIASTOLIC BLOOD PRESSURE: 85 MMHG | TEMPERATURE: 98 F | HEART RATE: 95 BPM | RESPIRATION RATE: 17 BRPM | SYSTOLIC BLOOD PRESSURE: 140 MMHG | OXYGEN SATURATION: 95 %

## 2023-06-27 RX ADMIN — OXYCODONE HYDROCHLORIDE 5 MILLIGRAM(S): 5 TABLET ORAL at 15:37

## 2023-06-27 RX ADMIN — Medication 975 MILLIGRAM(S): at 06:14

## 2023-06-27 RX ADMIN — MAGNESIUM HYDROXIDE 30 MILLILITER(S): 400 TABLET, CHEWABLE ORAL at 15:38

## 2023-06-27 RX ADMIN — Medication 975 MILLIGRAM(S): at 00:17

## 2023-06-27 RX ADMIN — Medication 100 MILLIGRAM(S): at 06:14

## 2023-06-27 RX ADMIN — Medication 975 MILLIGRAM(S): at 15:37

## 2023-06-27 NOTE — PROGRESS NOTE ADULT - ATTENDING COMMENTS
38y  now POD#1 s/p _cesarean section at 36 w 5d gestation stat under general anesthesia 2/2 breech and in labor (10 cm); extensive uterine extensions to cervix noted.    Post op anemia secondary to acute blood loss.  Patient in stable condition.  vital signs stable.  encourage patient to ambulate  continue post op care    Dr Del Angel
Agree with assessment and plan as above. Patient is stable for discharge home.
patient seen at bedside, dressing with small aged stains less than 50% saturation, good seal so kept in place, patient previously mildly tachy now resolved, afebrile, s/p ancef and metronidazole for coverage of BV. PECwoSF, BP stable, no PEC symptoms, needs follow up with cardio OB as outpatient. Patient passed gas, voiding and ambulating with no issues, possible discharge home tomorrow if vitals continue to be stable.

## 2023-06-27 NOTE — PROGRESS NOTE ADULT - SUBJECTIVE AND OBJECTIVE BOX
TRAVON RAMOS is a 38y  now POD#3 s/p  section at 36w5d gestation, uncomplicated.    S:    No acute events overnight.   Notes abdomen is warms and tender, but pain is controlled with tylenol and ibuprofen   Pain controlled with current treatment regimen.   She is ambulating without difficulty and tolerating PO.   -flatus/-BM/+ voiding   She endorses appropriate lochia, which is decreasing.   She is supplementing breastfeeds with formula due to little milk production, lactation consultant already following   She denies fevers, chills, nausea and vomiting.   She denies lightheadedness, dizziness, palpitations, chest pain and SOB.     O:    T(C): 36.6 (23 @ 23:51), Max: 37.2 (23 @ 16:15)  HR: 110 (23 @ 23:51) (108 - 120)  BP: 104/86 (23 @ 23:51) (104/86 - 131/80)  RR: 16 (23 @ 23:51) (16 - 17)  SpO2: 97% (23 @ 23:51) (95% - 99%)    Gen: NAD, AOx3  CV: RRR, S1/S2 present  Pulm: CTAB  Breast: Nontender, non-engorged   Abdomen:  Soft, tender, warm to touch non-distended, +bowel sounds  Incision: dressing with dried blood primarily on right side   Uterus:  Fundus firm below umbilicus  VE:  Expectant lochia  Ext:  Non-tender and non-edematous                          11.4   10.82 )-----------( 181      ( 2023 17:27 )             33.2         136  |  108  |  10  ----------------------------<  79  3.4<L>   |  20<L>  |  0.51    Ca    7.9<L>      2023 09:19    TPro  5.8<L>  /  Alb  1.9<L>  /  TBili  0.4  /  DBili  x   /  AST  30  /  ALT  19  /  AlkPhos  236<H>        A/P:    -Vital signs stable  -Hgb: 11.4  -Voiding, tolerating PO, no bowel movement yet   -Advance care as tolerated   -Continue routine postpartum and postoperative care and education  -Healthy female infant  -Dispo: Patient to be discharged when meeting all postpartum and postoperative milestones and pending attending approval. TRAVON RAMOS is a 38y  now POD#3 s/p  section at 36w5d gestation, uncomplicated.    S:    No acute events overnight.   Notes abdomen is warms and tender, but pain is controlled with tylenol and ibuprofen   Pain controlled with current treatment regimen.   She is ambulating without difficulty and tolerating PO.   -flatus/-BM/+ voiding   She endorses appropriate lochia, which is decreasing.   She is supplementing breastfeeds with formula due to little milk production, lactation consultant already following   She denies fevers, chills, nausea and vomiting.   She denies lightheadedness, dizziness, palpitations, chest pain and SOB.     O:    T(C): 36.6 (23 @ 23:51), Max: 37.2 (23 @ 16:15)  HR: 110 (23 @ 23:51) (108 - 120)  BP: 104/86 (23 @ 23:51) (104/86 - 131/80)  RR: 16 (23 @ 23:51) (16 - 17)  SpO2: 97% (23 @ 23:51) (95% - 99%)    Gen: NAD, AOx3  CV: RRR, S1/S2 present  Pulm: CTAB  Breast: Nontender, non-engorged   Abdomen:  Soft, tender, warm to touch non-distended, +bowel sounds  Incision: dressing with dried blood primarily on right side   Uterus:  Fundus firm below umbilicus  VE:  Expectant lochia  Ext:  Non-tender and non-edematous                          11.4   10.82 )-----------( 181      ( 2023 17:27 )             33.2         136  |  108  |  10  ----------------------------<  79  3.4<L>   |  20<L>  |  0.51    Ca    7.9<L>      2023 09:19    TPro  5.8<L>  /  Alb  1.9<L>  /  TBili  0.4  /  DBili  x   /  AST  30  /  ALT  19  /  AlkPhos  236<H>        A/P:    -Vital signs stable  -Hgb: 11.4  -Voiding, tolerating PO, no bowel movement yet   -Advance care as tolerated   -Continue routine postpartum and postoperative care and education  -Healthy female infant  -DVT ppx SCDs; lovenox held for now  -Dispo: Pt is stable, doing well and meeting all postpartum and postoperative milestones. Possible discharge to home today pending attending approval

## 2023-06-28 NOTE — HISTORY OF PRESENT ILLNESS
[FreeTextEntry1] : 38F Upper sorbian-speaking [id number] recent postpartum via  at 36 weeks with preeclampsia but normotensive, presents for cardiology evaluation.

## 2023-06-29 ENCOUNTER — APPOINTMENT (OUTPATIENT)
Dept: CARDIOLOGY | Facility: CLINIC | Age: 38
End: 2023-06-29

## 2023-07-03 ENCOUNTER — APPOINTMENT (OUTPATIENT)
Dept: ANTEPARTUM | Facility: CLINIC | Age: 38
End: 2023-07-03

## 2023-07-05 DIAGNOSIS — D62 ACUTE POSTHEMORRHAGIC ANEMIA: ICD-10-CM

## 2023-07-05 DIAGNOSIS — Z3A.36 36 WEEKS GESTATION OF PREGNANCY: ICD-10-CM

## 2023-07-05 DIAGNOSIS — O23.593 INFECTION OF OTHER PART OF GENITAL TRACT IN PREGNANCY, THIRD TRIMESTER: ICD-10-CM

## 2023-07-10 ENCOUNTER — APPOINTMENT (OUTPATIENT)
Dept: ANTEPARTUM | Facility: CLINIC | Age: 38
End: 2023-07-10

## 2023-08-31 DIAGNOSIS — Z30.017 ENCOUNTER FOR INITIAL PRESCRIPTION OF IMPLANTABLE SUBDERMAL CONTRACEPTIVE: ICD-10-CM

## 2023-08-31 DIAGNOSIS — Z98.891 HISTORY OF UTERINE SCAR FROM PREVIOUS SURGERY: ICD-10-CM

## 2023-09-05 ENCOUNTER — APPOINTMENT (OUTPATIENT)
Dept: OBGYN | Facility: CLINIC | Age: 38
End: 2023-09-05
Payer: MEDICAID

## 2023-09-05 VITALS — DIASTOLIC BLOOD PRESSURE: 92 MMHG | SYSTOLIC BLOOD PRESSURE: 132 MMHG

## 2023-09-05 VITALS
TEMPERATURE: 98.1 F | SYSTOLIC BLOOD PRESSURE: 143 MMHG | HEART RATE: 97 BPM | DIASTOLIC BLOOD PRESSURE: 93 MMHG | OXYGEN SATURATION: 98 %

## 2023-09-05 DIAGNOSIS — Z32.02 ENCOUNTER FOR PREGNANCY TEST, RESULT NEGATIVE: ICD-10-CM

## 2023-09-05 LAB
HCG UR QL: NEGATIVE
QUALITY CONTROL: YES

## 2023-09-05 PROCEDURE — 99213 OFFICE O/P EST LOW 20 MIN: CPT | Mod: TH,25

## 2023-09-05 PROCEDURE — 11981 INSERTION DRUG DLVR IMPLANT: CPT

## 2023-09-05 PROCEDURE — 81025 URINE PREGNANCY TEST: CPT

## 2023-09-05 RX ORDER — ETONOGESTREL 68 MG/1
68 IMPLANT SUBCUTANEOUS
Refills: 0 | Status: ACTIVE | COMMUNITY
Start: 2023-09-05

## 2023-09-05 RX ORDER — ETONOGESTREL 68 MG/1
68 IMPLANT SUBCUTANEOUS
Qty: 0 | Refills: 0 | Status: COMPLETED | OUTPATIENT
Start: 2023-09-05

## 2023-09-05 RX ADMIN — ETONOGESTREL 0 MG: 68 IMPLANT SUBCUTANEOUS at 00:00

## 2023-09-05 NOTE — HISTORY OF PRESENT ILLNESS
[FreeTextEntry1] : PT referred by [facility] #[id number]  37 yo  s/p  in , currently breast feeding, presenting requesting Nexplanon insertion. No intercourse since delivery  All: NKDA Meds: Vit D Obhx: NSVDx1, c-secx1 Gynhx: routine care at [facility] PMH/PSH: as above   Nexplanon insertion HcG: negative  The patient was counseled on the risks, benefits and alternatives of the subdermal implant.  The patient was counseled that the implant goes under the skin of the arm, it is a thin, matchstick-sized michael made of plastic that releases the hormone progestin.  This hormone like the hormone made by the body.  It prevents pregnancy by preventing ovulation and thickening cervical mucous, this prevents sperm from getting to eggs.  It is effective for 5 years.  The benefits of the implant are: There nothing the patient has to do prior to sex to make the implant work.  Return to fertility after implant removal is immediate.  It helps with painful menses.  The risks of the implant are: discoloring or a scar on the arm where the implant goes in.  Rarely, arm pain for longer than a few days.  Rarely, an infection or pain in the arm that needs medicine.  Very rarely, an implant may move from the place where it was put in. The side effects of the implant are: nausea (which usually clears up in 2-3 months), sore breasts (usually clears up in 2-3 months), headache, irregular bleeding (including early or late periods, spotting in between menses, or no periods), weight gain, soreness, bruising, or swelling for a few days after the implant is put in.   The implant may affect other medications the patient is taking, and the patient was instructed to tell their physicians if medications are changed.  No promise can be made about the outcome of putting in the implant.   The patient voiced understanding of the risks of irregular bleeding and the need to use condoms for protection from STIs.  They were counseled on the need to use back up contraception x 1 week.  The patient is Right handed.  Pre-operative time out performed.  Patients name, date of birth and procedure confirmed.  The patients non-dominant arm was flexed at the elbow and externally rotated so that their hand was underneath their head. The insertion site was identified as overlying the triceps muscle about 8-10 cm from the medial epicondyle of the humerus and 3-5 cm posterior to the sulcus between the biceps and triceps muscles, inserted as far posterior from the sulcus as possible The arm was prepped with betadyne. 3 cc of 1% lidocaine was injected.   The skin was punctured with the tip of the needle slightly angled at less 30 degrees, and the needle was inserted until the bevel was just under the skin.  The applicator was lowered to a nearly  horizontal position and the skin was lifted with the needle while sliding the needle to its full length and tenting the skin upwards. The slider was moved back until it stopped and the implant inserted. The implant was palpated by the provider and the patient.  Excellent hemostasis noted.  Steri-strip was applied with instructions to keep on x 72 hours.  Pressure dressing was applied using sterile gauze, with instructions to keep on x 24 hours.  The patient tolerated the procedure well. EBL: minimal  Nexplanon device provided by my office. Patient given a User Card with instructions to follow up as needed and to have device removed in 5 years.   Lot#: [id number] Expiration for insertion: 2025 NDC: 83055-635-79

## 2023-09-05 NOTE — PLAN
[FreeTextEntry1] : 39 yo s/p nexplanon insertion doing well. - back up x 7 days - condoms for STI protection - routine care at [facility] - return to me prn